# Patient Record
Sex: FEMALE | Employment: UNEMPLOYED | URBAN - METROPOLITAN AREA
[De-identification: names, ages, dates, MRNs, and addresses within clinical notes are randomized per-mention and may not be internally consistent; named-entity substitution may affect disease eponyms.]

---

## 2023-08-24 ENCOUNTER — HOSPITAL ENCOUNTER (OUTPATIENT)
Dept: PHYSICAL THERAPY | Age: 28
Setting detail: THERAPIES SERIES
Discharge: HOME | End: 2023-08-24
Payer: COMMERCIAL

## 2023-08-24 PROCEDURE — 97110 THERAPEUTIC EXERCISES: CPT

## 2023-08-24 PROCEDURE — 97112 NEUROMUSCULAR REEDUCATION: CPT

## 2023-08-24 PROCEDURE — 97140 MANUAL THERAPY 1/> REGIONS: CPT

## 2023-08-24 PROCEDURE — 97161 PT EVAL LOW COMPLEX 20 MIN: CPT

## 2023-08-24 NOTE — FLOWSHEET NOTE
Patella Mobs 6'    PROM Knee Flex/Ext 10'                             [address] access code:   Access Code: [email or web address]  URL: [email or web address]  Date: 08/24/2023  Prepared by: Luis Cantor    Exercises  - Supine Quad Set  - 2-3 x daily - 7 x weekly - 2 sets - 10 reps - 5-10 seconds hold  - Supine Gluteal Sets  - 1-2 x daily - 7 x weekly - 1-2 sets - 10 reps - 5 seconds hold  - Long Sitting Calf Stretch with Strap  - 2-3 x daily - 7 x weekly - 1 sets - 3 reps - 30 seconds hold  - Small Range Straight Leg Raise  - 2-3 x daily - 7 x weekly - 2 sets - 10 reps  - Sidelying Hip Abduction  - 1-2 x daily - 7 x weekly - 2-3 sets - 10 reps  - Prone Hip Extension  - 1-2 x daily - 7 x weekly - 2-3 sets - 10 reps  - Prone Quadriceps Set  - 1-2 x daily - 7 x weekly - 2-3 sets - 10 reps - 5 seconds  hold  - Supine Heel Slide with Strap  - 2-3 x daily - 7 x weekly - 2-3 sets - 10 reps - 5-10 hold             Patient Education 10' Pt education with HEP and progression of PT along with compliance with HEP to aide with formal PT for optimal outcomes. Therapeutic Exercise and NMR EXR  [x] (28299) Provided verbal/tactile cueing for activities related to strengthening, flexibility, endurance, ROM for improvements in LE, proximal hip, and core control with self care, mobility, lifting, ambulation. [x] (25353) Provided verbal/tactile cueing for activities related to improving balance, coordination, kinesthetic sense, posture, motor skill, proprioception to assist with LE, proximal hip, and core control in self-care, mobility, lifting, ambulation and eccentric single leg control. NMR and Therapeutic Activities:    [x] (88750 or 43718) Provided verbal/tactile cueing for activities related to improving balance, coordination, kinesthetic sense, posture, motor skill, proprioception and motor activation to allow for proper function of core, proximal hip and LE with self-care and ADLs and functional mobility.    [x] (57416)

## 2023-08-30 ENCOUNTER — HOSPITAL ENCOUNTER (OUTPATIENT)
Dept: PHYSICAL THERAPY | Age: 28
Setting detail: THERAPIES SERIES
Discharge: HOME | End: 2023-08-30
Payer: COMMERCIAL

## 2023-08-30 PROCEDURE — 97112 NEUROMUSCULAR REEDUCATION: CPT

## 2023-08-30 PROCEDURE — 97140 MANUAL THERAPY 1/> REGIONS: CPT

## 2023-08-30 PROCEDURE — 97110 THERAPEUTIC EXERCISES: CPT

## 2023-09-01 ENCOUNTER — HOSPITAL ENCOUNTER (OUTPATIENT)
Dept: PHYSICAL THERAPY | Age: 28
Setting detail: THERAPIES SERIES
Discharge: HOME | End: 2023-09-01
Payer: COMMERCIAL

## 2023-09-01 PROCEDURE — 97112 NEUROMUSCULAR REEDUCATION: CPT

## 2023-09-01 PROCEDURE — 97140 MANUAL THERAPY 1/> REGIONS: CPT

## 2023-09-01 PROCEDURE — 97110 THERAPEUTIC EXERCISES: CPT

## 2023-09-05 ENCOUNTER — HOSPITAL ENCOUNTER (OUTPATIENT)
Dept: PHYSICAL THERAPY | Age: 28
Setting detail: THERAPIES SERIES
Discharge: HOME | End: 2023-09-05
Payer: COMMERCIAL

## 2023-09-05 PROCEDURE — 97110 THERAPEUTIC EXERCISES: CPT | Performed by: PHYSICAL THERAPY ASSISTANT

## 2023-09-05 PROCEDURE — 97112 NEUROMUSCULAR REEDUCATION: CPT | Performed by: PHYSICAL THERAPY ASSISTANT

## 2023-09-05 PROCEDURE — 97140 MANUAL THERAPY 1/> REGIONS: CPT | Performed by: PHYSICAL THERAPY ASSISTANT

## 2023-09-05 NOTE — FLOWSHEET NOTE
complexities/Impairments listed. [] Progression has been slowed due to co-morbidities. [x] Plan just implemented, too soon to assess goals progression <30days   [] Goals require adjustment due to lack of progress  [] Patient is not progressing as expected and requires additional follow up with physician  [] Other    Prognosis for POC: [x] Good [] Fair  [] Poor    Patient requires continued skilled intervention: [x] Yes  [] No    Treatment/Activity Tolerance:  [x] Patient able to complete treatment  [] Patient limited by fatigue  [] Patient limited by pain    [] Patient limited by other medical complications  [] Other:     Return to Play: (if applicable)   []  Stage 1: Intro to Strength   []  Stage 2: Return to Run and Strength   []  Stage 3: Return to Jump and Strength   []  Stage 4: Dynamic Strength and Agility   []  Stage 5: Sport Specific Training     []  Ready to Return to Play, Meets All Above Stages   []  Not Ready for Return to Sports   Comments:                         PLAN: See eval  [x] Continue per plan of care [] Alter current plan (see comments above)  [] Plan of care initiated [] Hold pending MD visit [] Discharge    Electronically signed by:  Kari Preston PTA    Note: If patient does not return for scheduled/ recommended follow up visits, this note will serve as a discharge from care along with most recent update on progress.

## 2023-09-07 ENCOUNTER — HOSPITAL ENCOUNTER (OUTPATIENT)
Dept: PHYSICAL THERAPY | Age: 28
Setting detail: THERAPIES SERIES
Discharge: HOME | End: 2023-09-07
Payer: COMMERCIAL

## 2023-09-07 PROCEDURE — 97112 NEUROMUSCULAR REEDUCATION: CPT | Performed by: PHYSICAL THERAPY ASSISTANT

## 2023-09-07 PROCEDURE — 97110 THERAPEUTIC EXERCISES: CPT | Performed by: PHYSICAL THERAPY ASSISTANT

## 2023-09-07 PROCEDURE — 97140 MANUAL THERAPY 1/> REGIONS: CPT | Performed by: PHYSICAL THERAPY ASSISTANT

## 2023-09-07 NOTE — FLOWSHEET NOTE
listed. [] Progression is slowed due to complexities/Impairments listed. [] Progression has been slowed due to co-morbidities. [x] Plan just implemented, too soon to assess goals progression <30days   [] Goals require adjustment due to lack of progress  [] Patient is not progressing as expected and requires additional follow up with physician  [] Other    Prognosis for POC: [x] Good [] Fair  [] Poor    Patient requires continued skilled intervention: [x] Yes  [] No    Treatment/Activity Tolerance:  [x] Patient able to complete treatment  [] Patient limited by fatigue  [] Patient limited by pain    [] Patient limited by other medical complications  [] Other:     Return to Play: (if applicable)   []  Stage 1: Intro to Strength   []  Stage 2: Return to Run and Strength   []  Stage 3: Return to Jump and Strength   []  Stage 4: Dynamic Strength and Agility   []  Stage 5: Sport Specific Training     []  Ready to Return to Play, Meets All Above Stages   []  Not Ready for Return to Sports   Comments:                         PLAN: See eval  [x] Continue per plan of care [] Alter current plan (see comments above)  [] Plan of care initiated [] Hold pending MD visit [] Discharge    Electronically signed by:  Humera Jimenez PTA    Note: If patient does not return for scheduled/ recommended follow up visits, this note will serve as a discharge from care along with most recent update on progress.

## 2023-09-12 ENCOUNTER — HOSPITAL ENCOUNTER (OUTPATIENT)
Dept: PHYSICAL THERAPY | Age: 28
Setting detail: THERAPIES SERIES
Discharge: HOME | End: 2023-09-12
Payer: COMMERCIAL

## 2023-09-12 PROCEDURE — 97140 MANUAL THERAPY 1/> REGIONS: CPT | Performed by: PHYSICAL THERAPY ASSISTANT

## 2023-09-12 PROCEDURE — 97112 NEUROMUSCULAR REEDUCATION: CPT | Performed by: PHYSICAL THERAPY ASSISTANT

## 2023-09-12 PROCEDURE — 97110 THERAPEUTIC EXERCISES: CPT | Performed by: PHYSICAL THERAPY ASSISTANT

## 2023-09-14 ENCOUNTER — HOSPITAL ENCOUNTER (OUTPATIENT)
Dept: PHYSICAL THERAPY | Age: 28
Setting detail: THERAPIES SERIES
Discharge: HOME | End: 2023-09-14
Payer: COMMERCIAL

## 2023-09-14 PROCEDURE — 97112 NEUROMUSCULAR REEDUCATION: CPT | Performed by: PHYSICAL THERAPY ASSISTANT

## 2023-09-14 PROCEDURE — 97140 MANUAL THERAPY 1/> REGIONS: CPT | Performed by: PHYSICAL THERAPY ASSISTANT

## 2023-09-14 PROCEDURE — 97110 THERAPEUTIC EXERCISES: CPT | Performed by: PHYSICAL THERAPY ASSISTANT

## 2023-09-14 NOTE — FLOWSHEET NOTE
functional goals listed. [] Progression is slowed due to complexities/Impairments listed. [] Progression has been slowed due to co-morbidities. [] Plan just implemented, too soon to assess goals progression <30days   [] Goals require adjustment due to lack of progress  [] Patient is not progressing as expected and requires additional follow up with physician  [] Other    Prognosis for POC: [x] Good [] Fair  [] Poor    Patient requires continued skilled intervention: [x] Yes  [] No    Treatment/Activity Tolerance:  [x] Patient able to complete treatment  [] Patient limited by fatigue  [] Patient limited by pain    [] Patient limited by other medical complications  [] Other:     Return to Play: (if applicable)   []  Stage 1: Intro to Strength   []  Stage 2: Return to Run and Strength   []  Stage 3: Return to Jump and Strength   []  Stage 4: Dynamic Strength and Agility   []  Stage 5: Sport Specific Training     []  Ready to Return to Play, Meets All Above Stages   []  Not Ready for Return to Sports   Comments:                         PLAN: See eval  [x] Continue per plan of care [] Alter current plan (see comments above)  [] Plan of care initiated [] Hold pending MD visit [] Discharge    Electronically signed by:  Camacho Hurtado PTA    Note: If patient does not return for scheduled/ recommended follow up visits, this note will serve as a discharge from care along with most recent update on progress.

## 2023-09-19 ENCOUNTER — HOSPITAL ENCOUNTER (OUTPATIENT)
Dept: PHYSICAL THERAPY | Age: 28
Setting detail: THERAPIES SERIES
End: 2023-09-19
Payer: COMMERCIAL

## 2023-09-21 ENCOUNTER — HOSPITAL ENCOUNTER (OUTPATIENT)
Dept: PHYSICAL THERAPY | Age: 28
Setting detail: THERAPIES SERIES
Discharge: HOME | End: 2023-09-21
Payer: COMMERCIAL

## 2023-09-21 PROCEDURE — 97112 NEUROMUSCULAR REEDUCATION: CPT | Performed by: PHYSICAL THERAPY ASSISTANT

## 2023-09-21 PROCEDURE — 97110 THERAPEUTIC EXERCISES: CPT | Performed by: PHYSICAL THERAPY ASSISTANT

## 2023-09-21 PROCEDURE — 97140 MANUAL THERAPY 1/> REGIONS: CPT | Performed by: PHYSICAL THERAPY ASSISTANT

## 2023-09-21 NOTE — PROGRESS NOTES
[facility], [address] [place] [address], [address]  Phone: [phone], [phone]    Physical Therapy Treatment Note/ Progress Report:   Date: 2023          Patient Name; :  Beba Dan; 1995   Dx/ICD Code:  Diagnosis:L ACL Repair (J78.457M) w/ Allograft Achilles Tendon Graft 23            Treatment Diagnosis: Left Knee Pain (M25.562)             Physician:  Dr. Ariana Moser        Total PT Visits: 7     Measures Previous Current   Pain (0-10) Current: 0/10; Worst 6/10 0/10   Disability % LEFS :74 % (Total: 21/80)  LEFS:  8% (Total:  74/80)        ROM Lacking 6 degrees of extension - 80 degrees flexion  0-125        Strength Hip flexors 3-/5 Hip flexors 4/5    Hip ABD 3-/5 Hip ABD 4+/5     Quad good minus     Specific Functional Improvements & Impressions:  Sarina Soto is progressing well with ROM, strength and functional activities. She still demonstrates some quad weakness although this is improving. She is wearing unlocked brace at all times. Doing well ascending and descending stairs. No issues with sleeping. Not taking any pain medications. She did have a bout of COVID last week which limited her HEP ability but otherwise she has done well with HEP follow-up. Plan & Recommendations:  [x] Continue rehabilitation due to objective improvement and continued functional deficits with frequency and duration: 2 times a week for 6-8 weeks  [] Progress toward  []GAP, []Work Conditioning, []Independent HEP   [] Discharge due to   [] All goals achieved, [] Maximized \"medical necessity\" [] No subjective or objective improvements      Electronically signed by:  Evangelina Celaya PTA ; Keira Little PT,MPT,ATC, cert DN     Therapy Plan of Care Re-Certification  This patient has been re-evaluated for physical therapy services and for therapy to continue, Medicare, Medicaid and other insurances require periodic physician review of the treatment plan.  Please review the above

## 2023-09-26 ENCOUNTER — HOSPITAL ENCOUNTER (OUTPATIENT)
Dept: PHYSICAL THERAPY | Age: 28
Setting detail: THERAPIES SERIES
Discharge: HOME | End: 2023-09-26
Payer: COMMERCIAL

## 2023-09-26 PROCEDURE — 97140 MANUAL THERAPY 1/> REGIONS: CPT | Performed by: PHYSICAL THERAPY ASSISTANT

## 2023-09-26 PROCEDURE — 97110 THERAPEUTIC EXERCISES: CPT | Performed by: PHYSICAL THERAPY ASSISTANT

## 2023-09-26 PROCEDURE — 97112 NEUROMUSCULAR REEDUCATION: CPT | Performed by: PHYSICAL THERAPY ASSISTANT

## 2023-09-28 ENCOUNTER — HOSPITAL ENCOUNTER (OUTPATIENT)
Dept: PHYSICAL THERAPY | Age: 28
Setting detail: THERAPIES SERIES
Discharge: HOME | End: 2023-09-28
Payer: COMMERCIAL

## 2023-09-28 PROCEDURE — 97110 THERAPEUTIC EXERCISES: CPT | Performed by: PHYSICAL THERAPY ASSISTANT

## 2023-09-28 PROCEDURE — 97140 MANUAL THERAPY 1/> REGIONS: CPT | Performed by: PHYSICAL THERAPY ASSISTANT

## 2023-09-28 PROCEDURE — 97112 NEUROMUSCULAR REEDUCATION: CPT | Performed by: PHYSICAL THERAPY ASSISTANT

## 2023-09-28 NOTE — FLOWSHEET NOTE
specific functional goal)    [] Progressing: [] Met: [] Not Met: [] Adjusted      Overall Progression Towards Functional goals/ Treatment Progress Update:  [x] Patient is progressing as expected towards functional goals listed. [] Progression is slowed due to complexities/Impairments listed. [] Progression has been slowed due to co-morbidities. [] Plan just implemented, too soon to assess goals progression <30days   [] Goals require adjustment due to lack of progress  [] Patient is not progressing as expected and requires additional follow up with physician  [] Other    Prognosis for POC: [x] Good [] Fair  [] Poor    Patient requires continued skilled intervention: [x] Yes  [] No    Treatment/Activity Tolerance:  [x] Patient able to complete treatment  [] Patient limited by fatigue  [] Patient limited by pain    [] Patient limited by other medical complications  [] Other:     Return to Play: (if applicable)   []  Stage 1: Intro to Strength   []  Stage 2: Return to Run and Strength   []  Stage 3: Return to Jump and Strength   []  Stage 4: Dynamic Strength and Agility   []  Stage 5: Sport Specific Training     []  Ready to Return to Play, Meets All Above Stages   []  Not Ready for Return to Sports   Comments:                         PLAN: See eval  [x] Continue per plan of care [] Alter current plan (see comments above)  [] Plan of care initiated [] Hold pending MD visit [] Discharge    Electronically signed by:  Reyes Sack, PTA        Note: If patient does not return for scheduled/ recommended follow up visits, this note will serve as a discharge from care along with most recent update on progress.

## 2023-10-03 ENCOUNTER — HOSPITAL ENCOUNTER (OUTPATIENT)
Dept: PHYSICAL THERAPY | Age: 28
Setting detail: THERAPIES SERIES
Discharge: HOME | End: 2023-10-03
Payer: COMMERCIAL

## 2023-10-03 NOTE — FLOWSHEET NOTE
[facility]    Physical Therapy  Cancellation/No-show Note  Patient Name:  Jamal Jorge  :  1995   Date:  10/3/2023    Cancelled visits to date: 1  No-shows to date: 0    For today's appointment patient:  [x]  Cancelled  []  Rescheduled appointment  []  No-show     Reason given by patient:  []  Patient ill  []  Conflicting appointment  []  No transportation    []  Conflict with work  []  No reason given  [x]  Other:     Comments:  Sick with child     Phone call information:   [x]  Phone call made today to patient. []  Patient answered, conversation as follows:    []  Patient did not answer. []  Phone call not needed - pt contacted us to cancel and provided reason for cancellation.      Electronically signed by:  Nati Vila, PT, MPT,ATC, cert DN

## 2023-10-05 ENCOUNTER — HOSPITAL ENCOUNTER (OUTPATIENT)
Dept: PHYSICAL THERAPY | Age: 28
Setting detail: THERAPIES SERIES
Discharge: HOME | End: 2023-10-05
Payer: COMMERCIAL

## 2023-10-05 PROCEDURE — 97110 THERAPEUTIC EXERCISES: CPT | Performed by: PHYSICAL THERAPY ASSISTANT

## 2023-10-05 PROCEDURE — 97112 NEUROMUSCULAR REEDUCATION: CPT | Performed by: PHYSICAL THERAPY ASSISTANT

## 2023-10-05 PROCEDURE — 97140 MANUAL THERAPY 1/> REGIONS: CPT | Performed by: PHYSICAL THERAPY ASSISTANT

## 2023-10-10 ENCOUNTER — APPOINTMENT (OUTPATIENT)
Dept: PHYSICAL THERAPY | Age: 28
End: 2023-10-10
Payer: COMMERCIAL

## 2023-10-12 ENCOUNTER — APPOINTMENT (OUTPATIENT)
Dept: PHYSICAL THERAPY | Age: 28
End: 2023-10-12
Payer: COMMERCIAL

## 2023-10-17 ENCOUNTER — HOSPITAL ENCOUNTER (OUTPATIENT)
Dept: PHYSICAL THERAPY | Age: 28
Setting detail: THERAPIES SERIES
Discharge: HOME | End: 2023-10-17
Payer: COMMERCIAL

## 2023-10-17 NOTE — FLOWSHEET NOTE
[address] [place] [address]    Physical Therapy  Cancellation/No-show Note  Patient Name:  Hina Quintanilla  :  1995   Date:  10/17/2023    Cancelled visits to date: 2  No-shows to date: 0    For today's appointment patient:  [x]  Cancelled  []  Rescheduled appointment  []  No-show     Reason given by patient:  []  Patient ill  []  Conflicting appointment  []  No transportation    []  Conflict with work  []  No reason given  [x]  Other:     Comments:  Conflict     Phone call information:   []  Phone call made today to patient. []  Patient answered, conversation as follows:    []  Patient did not answer. [x]  Phone call not needed - pt contacted us to cancel and provided reason for cancellation.      Electronically signed by:  Caitlyn Stokes PTA

## 2023-10-19 ENCOUNTER — HOSPITAL ENCOUNTER (OUTPATIENT)
Dept: PHYSICAL THERAPY | Age: 28
Setting detail: THERAPIES SERIES
Discharge: HOME | End: 2023-10-19
Payer: COMMERCIAL

## 2023-10-19 PROCEDURE — 97110 THERAPEUTIC EXERCISES: CPT

## 2023-10-19 PROCEDURE — 97140 MANUAL THERAPY 1/> REGIONS: CPT

## 2023-10-19 PROCEDURE — 97112 NEUROMUSCULAR REEDUCATION: CPT

## 2023-10-19 NOTE — PLAN OF CARE
Met: [] Adjusted   3. Patient will demonstrate an increase in Strength to L LE to 4+/5 grossly allow for proper functional mobility as indicated by patients Functional Deficits. [x] Progressing: [] Met: [] Not Met: [] Adjusted   4. Patient will return to functional  activities with NRPS of </= 0-1/10. [x] Progressing: [] Met: [] Not Met: [] Adjusted   5. To be able to squat and play with kids without pain/restrictions (patient specific functional goal)    [x] Progressing: [] Met: [] Not Met: [] Adjusted      Overall Progression Towards Functional goals/ Treatment Progress Update:  [x] Patient is progressing as expected towards functional goals listed. [] Progression is slowed due to complexities/Impairments listed. [] Progression has been slowed due to co-morbidities.   [] Plan just implemented, too soon to assess goals progression <30days   [] Goals require adjustment due to lack of progress  [] Patient is not progressing as expected and requires additional follow up with physician  [] Other    Prognosis for POC: [x] Good [] Fair  [] Poor    Patient requires continued skilled intervention: [x] Yes  [] No    Treatment/Activity Tolerance:  [x] Patient able to complete treatment  [] Patient limited by fatigue  [] Patient limited by pain    [] Patient limited by other medical complications  [] Other:     Return to Play: (if applicable)   []  Stage 1: Intro to Strength   []  Stage 2: Return to Run and Strength   []  Stage 3: Return to Jump and Strength   []  Stage 4: Dynamic Strength and Agility   []  Stage 5: Sport Specific Training     []  Ready to Return to Play, Meets All Above Stages   []  Not Ready for Return to Sports   Comments:                         PLAN: See eval  [x] Continue per plan of care [] Alter current plan (see comments above)  [] Plan of care initiated [] Hold pending MD visit [] Discharge    Electronically signed by:  Anu Crow PTA        Note: If patient does not return for

## 2023-10-24 ENCOUNTER — HOSPITAL ENCOUNTER (OUTPATIENT)
Dept: PHYSICAL THERAPY | Age: 28
Setting detail: THERAPIES SERIES
Discharge: HOME | End: 2023-10-24
Payer: COMMERCIAL

## 2023-10-24 NOTE — FLOWSHEET NOTE
[address] [place] [address]    Physical Therapy  Cancellation/No-show Note  Patient Name:  Pete Wheatley  :  1995   Date:  10/24/2023    Cancelled visits to date: 3  No-shows to date: 0    For today's appointment patient:  [x]  Cancelled  []  Rescheduled appointment  []  No-show     Reason given by patient:  [x]  Patient ill  []  Conflicting appointment  []  No transportation    []  Conflict with work  []  No reason given  []  Other:     Comments:      Phone call information:   []  Phone call made today to patient. []  Patient answered, conversation as follows:    []  Patient did not answer. [x]  Phone call not needed - pt contacted us to cancel and provided reason for cancellation.      Electronically signed by:  Srini Valle PTA

## 2023-10-26 ENCOUNTER — APPOINTMENT (OUTPATIENT)
Dept: PHYSICAL THERAPY | Age: 28
End: 2023-10-26
Payer: COMMERCIAL

## 2023-10-31 ENCOUNTER — HOSPITAL ENCOUNTER (OUTPATIENT)
Dept: PHYSICAL THERAPY | Age: 28
Setting detail: THERAPIES SERIES
Discharge: HOME | End: 2023-10-31
Payer: COMMERCIAL

## 2023-10-31 PROCEDURE — 97140 MANUAL THERAPY 1/> REGIONS: CPT | Performed by: PHYSICAL THERAPY ASSISTANT

## 2023-10-31 PROCEDURE — 97112 NEUROMUSCULAR REEDUCATION: CPT | Performed by: PHYSICAL THERAPY ASSISTANT

## 2023-10-31 PROCEDURE — 97110 THERAPEUTIC EXERCISES: CPT | Performed by: PHYSICAL THERAPY ASSISTANT

## 2023-10-31 NOTE — FLOWSHEET NOTE
allow for proper functional mobility as indicated by patients Functional Deficits. [x] Progressing: [] Met: [] Not Met: [] Adjusted   4. Patient will return to functional  activities with NRPS of </= 0-1/10. [x] Progressing: [] Met: [] Not Met: [] Adjusted   5. To be able to squat and play with kids without pain/restrictions (patient specific functional goal)    [x] Progressing: [] Met: [] Not Met: [] Adjusted      Overall Progression Towards Functional goals/ Treatment Progress Update:  [x] Patient is progressing as expected towards functional goals listed. [] Progression is slowed due to complexities/Impairments listed. [] Progression has been slowed due to co-morbidities.   [] Plan just implemented, too soon to assess goals progression <30days   [] Goals require adjustment due to lack of progress  [] Patient is not progressing as expected and requires additional follow up with physician  [] Other    Prognosis for POC: [x] Good [] Fair  [] Poor    Patient requires continued skilled intervention: [x] Yes  [] No    Treatment/Activity Tolerance:  [x] Patient able to complete treatment  [] Patient limited by fatigue  [] Patient limited by pain    [] Patient limited by other medical complications  [] Other:     Return to Play: (if applicable)   []  Stage 1: Intro to Strength   []  Stage 2: Return to Run and Strength   []  Stage 3: Return to Jump and Strength   []  Stage 4: Dynamic Strength and Agility   []  Stage 5: Sport Specific Training     []  Ready to Return to Play, Meets All Above Stages   []  Not Ready for Return to Sports   Comments:                         PLAN: See eval  [x] Continue per plan of care [] Alter current plan (see comments above)  [] Plan of care initiated [] Hold pending MD visit [] Discharge    Electronically signed by:  Reyes Sack, PTA        Note: If patient does not return for scheduled/ recommended follow up visits, this note will serve as a discharge from care along with most recent

## 2023-11-08 ENCOUNTER — HOSPITAL ENCOUNTER (OUTPATIENT)
Dept: PHYSICAL THERAPY | Age: 28
Setting detail: THERAPIES SERIES
Discharge: HOME | End: 2023-11-08

## 2023-11-08 NOTE — FLOWSHEET NOTE
[address] [place] [address]    Physical Therapy  Cancellation/No-show Note  Patient Name:  Diamante Finley  :  1995   Date:  2023    Cancelled visits to date: 4  No-shows to date: 0    For today's appointment patient:  [x]  Cancelled  []  Rescheduled appointment  []  No-show     Reason given by patient:  []  Patient ill  []  Conflicting appointment  []  No transportation    []  Conflict with work  []  No reason given  [x]  Other:     Comments: Taking Grandpa home from hospital       Phone call information:   []  Phone call made today to patient. []  Patient answered, conversation as follows:    []  Patient did not answer. [x]  Phone call not needed - pt contacted us to cancel and provided reason for cancellation.      Electronically signed by:  Mariella Allison PTA

## 2023-11-14 ENCOUNTER — HOSPITAL ENCOUNTER (OUTPATIENT)
Dept: PHYSICAL THERAPY | Age: 28
Setting detail: THERAPIES SERIES
Discharge: HOME | End: 2023-11-14

## 2023-11-14 NOTE — FLOWSHEET NOTE
[facility]    Physical Therapy  Cancellation/No-show Note  Patient Name:  Iwona Fuller  :  1995   Date:  2023    Cancelled visits to date: 4  No-shows to date: 1    For today's appointment patient:  []  Cancelled  []  Rescheduled appointment  [x]  No-show     Reason given by patient:  []  Patient ill  []  Conflicting appointment  []  No transportation    []  Conflict with work  []  No reason given  []  Other:     Comments:      Phone call information:   [x]  Phone call made today to patient. []  Patient answered, conversation as follows:    [x]  Patient did not answer. []  Phone call not needed - pt contacted us to cancel and provided reason for cancellation.      Electronically signed by:  Reyes Sack, PTA

## 2023-11-21 ENCOUNTER — HOSPITAL ENCOUNTER (OUTPATIENT)
Dept: PHYSICAL THERAPY | Age: 28
Setting detail: THERAPIES SERIES
Discharge: HOME | End: 2023-11-21

## 2023-11-21 NOTE — FLOWSHEET NOTE
[address] [place] [address]    Physical Therapy  Cancellation/No-show Note  Patient Name:  Iwona Fuller  :  1995   Date:  2023    Cancelled visits to date: 4  No-shows to date: 1    For today's appointment patient:  []  Cancelled  []  Rescheduled appointment  [x]  No-show     Reason given by patient:  []  Patient ill  []  Conflicting appointment  []  No transportation    []  Conflict with work  []  No reason given  []  Other:     Comments:      Phone call information:   [x]  Phone call made today to patient. []  Patient answered, conversation as follows:    [x]  Patient did not answer. - Cancelling all other follow-ups at this point  []  Phone call not needed - pt contacted us to cancel and provided reason for cancellation.      Electronically signed by:  Reyes Sack, PTA